# Patient Record
Sex: FEMALE | Race: WHITE | NOT HISPANIC OR LATINO | Employment: FULL TIME | URBAN - METROPOLITAN AREA
[De-identification: names, ages, dates, MRNs, and addresses within clinical notes are randomized per-mention and may not be internally consistent; named-entity substitution may affect disease eponyms.]

---
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
## 2021-01-01 ENCOUNTER — TELEPHONE (OUTPATIENT)
Dept: URGENT CARE | Facility: CLINIC | Age: 21
End: 2021-01-01

## 2022-02-09 ENCOUNTER — LAB (OUTPATIENT)
Dept: LAB | Facility: HOSPITAL | Age: 22
End: 2022-02-09

## 2022-02-09 ENCOUNTER — OFFICE VISIT (OUTPATIENT)
Dept: INTERNAL MEDICINE | Facility: CLINIC | Age: 22
End: 2022-02-09

## 2022-02-09 VITALS
TEMPERATURE: 98.3 F | BODY MASS INDEX: 21.24 KG/M2 | HEIGHT: 64 IN | SYSTOLIC BLOOD PRESSURE: 100 MMHG | DIASTOLIC BLOOD PRESSURE: 58 MMHG | WEIGHT: 124.4 LBS | OXYGEN SATURATION: 98 % | HEART RATE: 54 BPM | RESPIRATION RATE: 20 BRPM

## 2022-02-09 DIAGNOSIS — Z32.01 POSITIVE PREGNANCY TEST: Primary | ICD-10-CM

## 2022-02-09 DIAGNOSIS — Z32.01 POSITIVE PREGNANCY TEST: ICD-10-CM

## 2022-02-09 DIAGNOSIS — N92.6 MISSED PERIOD: ICD-10-CM

## 2022-02-09 LAB
B-HCG UR QL: POSITIVE
EXPIRATION DATE: ABNORMAL
INTERNAL NEGATIVE CONTROL: NEGATIVE
INTERNAL POSITIVE CONTROL: POSITIVE
Lab: ABNORMAL

## 2022-02-09 PROCEDURE — 84702 CHORIONIC GONADOTROPIN TEST: CPT | Performed by: PHYSICIAN ASSISTANT

## 2022-02-09 PROCEDURE — 99213 OFFICE O/P EST LOW 20 MIN: CPT | Performed by: PHYSICIAN ASSISTANT

## 2022-02-09 PROCEDURE — 81025 URINE PREGNANCY TEST: CPT | Performed by: PHYSICIAN ASSISTANT

## 2022-02-09 RX ORDER — PRENATAL VIT/IRON FUM/FOLIC AC 27MG-0.8MG
1 TABLET ORAL DAILY
Qty: 30 TABLET | Refills: 11 | OUTPATIENT
Start: 2022-02-09 | End: 2023-01-18

## 2022-02-09 NOTE — PROGRESS NOTES
"Chief Complaint  Pregnancy Questions (january 30th pos pregnancy test )    Subjective          History of Present Illness  Gricel Rahman presents to [facility] for   Missed period:  Had a positive home pregnancy test 01/30/22, took test d/t missed period. She had another positive the next morning. Has been feeling some morning sickness/nausea. No stomach pains or bleeding. Does not take meds on a regular basis.       Review of Systems   Constitutional: Negative for fever and unexpected weight loss.   Respiratory: Negative for cough, shortness of breath and wheezing.    Cardiovascular: Negative for chest pain and palpitations.       The following portions of the patient's history were reviewed and updated as appropriate: allergies, current medications, past family history, past medical history, past social history, past surgical history and problem list.  No Known Allergies  Current Outpatient Medications on File Prior to Visit   Medication Sig Dispense Refill   • [DISCONTINUED] metroNIDAZOLE (FLAGYL) 500 MG tablet Take 1 tablet by mouth 2 (Two) Times a Day. 14 tablet 0     No current facility-administered medications on file prior to visit.     New Medications Ordered This Visit   Medications   • Prenatal Vit-Fe Fumarate-FA (prenatal vitamin 27-0.8) 27-0.8 MG tablet tablet     Sig: Take 1 tablet by mouth Daily.     Dispense:  30 tablet     Refill:  11     Social History     Tobacco Use   Smoking Status Passive Smoke Exposure - Never Smoker   Smokeless Tobacco Never Used        Objective   Vital Signs:   Vitals:    02/09/22 1129   BP: 100/58   Pulse: 54   Resp: 20   Temp: 98.3 °F (36.8 °C)   TempSrc: Temporal   SpO2: 98%   Weight: 56.4 kg (124 lb 6.4 oz)   Height: 162.6 cm (64\")   PainSc: 0-No pain      Physical Exam  Vitals reviewed.   Constitutional:       General: She is not in acute distress.     Appearance: Normal appearance.   HENT:      Head: Normocephalic and atraumatic. "   Eyes:      General: No scleral icterus.     Extraocular Movements: Extraocular movements intact.      Conjunctiva/sclera: Conjunctivae normal.   Cardiovascular:      Rate and Rhythm: Normal rate and regular rhythm.      Heart sounds: Normal heart sounds. No murmur heard.      Pulmonary:      Effort: Pulmonary effort is normal. No respiratory distress.      Breath sounds: Normal breath sounds. No stridor. No wheezing or rhonchi.   Musculoskeletal:      Cervical back: Normal range of motion and neck supple.   Skin:     General: Skin is warm and dry.      Coloration: Skin is not jaundiced.   Neurological:      General: No focal deficit present.      Mental Status: She is alert and oriented to person, place, and time.      Gait: Gait normal.   Psychiatric:         Mood and Affect: Mood normal.         Behavior: Behavior normal.        Patient's last menstrual period was 12/26/2021 (approximate).    Result Review :              Results for orders placed or performed in visit on 02/09/22   POC Pregnancy, Urine    Specimen: Urine   Result Value Ref Range    HCG, Urine, QL Positive (A) Negative    Lot Number [id number]     Internal Positive Control Positive Positive, Passed    Internal Negative Control Negative Negative, Passed    Expiration Date 04/30/2023           Assessment and Plan    Diagnoses and all orders for this visit:    1. Positive pregnancy test (Primary)  Assessment & Plan:  Refer to GYN, start prenatal, check serum HCG    Orders:  -     POC Pregnancy, Urine  -     Prenatal Vit-Fe Fumarate-FA (prenatal vitamin 27-0.8) 27-0.8 MG tablet tablet; Take 1 tablet by mouth Daily.  Dispense: 30 tablet; Refill: 11  -     Ambulatory Referral to Obstetrics / Gynecology  -     hCG, Quantitative, Pregnancy; Future    2. Missed period  -     POC Pregnancy, Urine  -     Prenatal Vit-Fe Fumarate-FA (prenatal vitamin 27-0.8) 27-0.8 MG tablet tablet; Take 1 tablet by mouth Daily.  Dispense: 30 tablet; Refill: 11  -      Ambulatory Referral to Obstetrics / Gynecology  -     hCG, Quantitative, Pregnancy; Future      Follow Up   Return if symptoms worsen or fail to improve.    Follow up if symptoms worsen or persist or has new or concerning symptoms, go to ER for severe symptoms.   Reviewed common medication effects and side effects and advised to report side effects immediately.  Encouraged medication compliance and the importance of keeping scheduled follow up appointments with me and any other providers.  If a referral was made please contact our office if you have not heard about an appointment in the next 2 weeks.   If labs or images are ordered we will contact you with the results within the next week.  If you have not heard from us after a week please call our office to inquire about the results.   Patient was given instructions and counseling regarding her condition or for health maintenance advice. Please see specific information pulled into the AVS if appropriate.     Clarissa Matute PA-C    * Please note that portions of this note were completed with a voice recognition program.

## 2022-02-10 ENCOUNTER — TELEPHONE (OUTPATIENT)
Dept: INTERNAL MEDICINE | Facility: CLINIC | Age: 22
End: 2022-02-10

## 2022-02-10 LAB — HCG INTACT+B SERPL-ACNC: NORMAL MIU/ML

## 2022-02-10 NOTE — TELEPHONE ENCOUNTER
----- Message from Clarissa Matute PA-C sent at 2/10/2022  9:38 AM EST -----  Blood test shows that you are about 6-7 weeks pregnant and your estimated due date is 10.2.22 based on your last period of 12.26.21. Please let me know if you do not hear about an appointment for OB in the next 1-2 weeks.

## 2022-03-02 ENCOUNTER — OFFICE VISIT (OUTPATIENT)
Dept: OBSTETRICS AND GYNECOLOGY | Facility: CLINIC | Age: 22
End: 2022-03-02

## 2022-03-02 ENCOUNTER — LAB (OUTPATIENT)
Dept: LAB | Facility: HOSPITAL | Age: 22
End: 2022-03-02

## 2022-03-02 DIAGNOSIS — O02.1 MISSED ABORTION: ICD-10-CM

## 2022-03-02 DIAGNOSIS — O02.1 MISSED ABORTION: Primary | ICD-10-CM

## 2022-03-02 LAB
ABO GROUP BLD: NORMAL
RH BLD: POSITIVE

## 2022-03-02 PROCEDURE — 99202 OFFICE O/P NEW SF 15 MIN: CPT | Performed by: STUDENT IN AN ORGANIZED HEALTH CARE EDUCATION/TRAINING PROGRAM

## 2022-03-02 PROCEDURE — 86900 BLOOD TYPING SEROLOGIC ABO: CPT

## 2022-03-02 PROCEDURE — 86901 BLOOD TYPING SEROLOGIC RH(D): CPT

## 2022-03-02 NOTE — PROGRESS NOTES
Subjective   No chief complaint on file.    Gricel Rahman is a 21 y.o. year old No obstetric history on file..  No LMP recorded.  She presented today initially for a new OB, but was found to have a missed AB on ultrasound.  She does not have any vaginal bleeding, but has started to have intermittent low abdominal pain.    OTHER THINGS SHE WANTS TO DISCUSS TODAY:  Nothing else    The following portions of the patient's history were reviewed and updated as appropriate:current medications, allergies and past medical history    Social History    Tobacco Use      Smoking status: Passive Smoke Exposure - Never Smoker      Smokeless tobacco: Never Used         Objective   There were no vitals taken for this visit.    General:  well developed; well nourished  no acute distress   Lungs:  breathing is unlabored   Heart:  Not performed.     Lab Review   No data reviewed    Imaging   Pelvic ultrasound report        Assessment   1. Missed AB     Plan   1. Discussed findings on ultrasound today consistent with missed AB.  Discussed all options including expectant versus medical versus surgical management.  At this time patient elects for expectant management.  We will follow-up in 1 week for repeat ultrasound.  Patient advised if she develops heavy vaginal bleeding to the point where she is experiencing dizziness, or feeling more than 1 pad per hour, patient advised to be seen in the ED for evaluation.   2. ABO Rh ordered for blood type   3. The importance of keeping all planned follow-up and taking all medications as prescribed was emphasized.  4. Follow up 1 week     No orders of the defined types were placed in this encounter.         This note was electronically signed.    Darshana Gibbs MD   March 2, 2022

## 2022-03-09 ENCOUNTER — OFFICE VISIT (OUTPATIENT)
Dept: OBSTETRICS AND GYNECOLOGY | Facility: CLINIC | Age: 22
End: 2022-03-09

## 2022-03-09 VITALS
BODY MASS INDEX: 22.53 KG/M2 | RESPIRATION RATE: 14 BRPM | DIASTOLIC BLOOD PRESSURE: 62 MMHG | HEIGHT: 64 IN | WEIGHT: 132 LBS | SYSTOLIC BLOOD PRESSURE: 106 MMHG

## 2022-03-09 DIAGNOSIS — O02.1 MISSED ABORTION: Primary | ICD-10-CM

## 2022-03-09 PROCEDURE — 99214 OFFICE O/P EST MOD 30 MIN: CPT | Performed by: STUDENT IN AN ORGANIZED HEALTH CARE EDUCATION/TRAINING PROGRAM

## 2022-03-09 NOTE — PROGRESS NOTES
"Subjective   Chief Complaint   Patient presents with   • Pregnancy Ultrasound     Missed AB     Gricel Rahman is a 21 y.o. year old .  No LMP recorded (lmp unknown). Patient is pregnant.  She presents for follow up of missed AB diagnosed on US 3/2/22. She reports she has had no bleeding at all since the last visit.     OTHER THINGS SHE WANTS TO DISCUSS TODAY:  Nothing else    The following portions of the patient's history were reviewed and updated as appropriate:current medications, allergies and past medical history    Social History    Tobacco Use      Smoking status: Passive Smoke Exposure - Never Smoker      Smokeless tobacco: Never Used         Objective   /62 (BP Location: Left arm, Patient Position: Sitting, Cuff Size: Adult)   Resp 14   Ht 162.6 cm (64\")   Wt 59.9 kg (132 lb)   LMP  (LMP Unknown)   Breastfeeding No   BMI 22.66 kg/m²     General:  well developed; well nourished  no acute distress   Lungs:  breathing is unlabored   Heart:  Not performed.     Lab Review   No data reviewed    Imaging   Pelvic ultrasound report        Assessment   1. Missed AB  2. Rh +     Plan   1. Discussed medical versus surgical management at this time.  Patient elects for surgical management. Today I discussed with Gricel the risks of her upcoming suction D & C. Risks including intraoperative bleeding, uterine infection, perforation of the uterine cavity, failure to fully remove all the products of conception, laceration of the cervix, catheter induced urinary tract infections and the small risk for deep vein thrombosis were all explained. Additionally, the small risk for reoperation in the event of unanticipated bleeding or surgical injury was discussed.  All of her questions were answered fully.  She left with a very clear understanding of the preoperative surgical indications and the nature of a suction D & C.  She understands to be NPO after midnight and to be at the preoperative area ~ 1 hour prior to " the scheduled surgical start time.  2. The importance of keeping all planned follow-up and taking all medications as prescribed was emphasized.  3. Follow up for procedure.     No orders of the defined types were placed in this encounter.         This note was electronically signed.    Darshana Gibbs MD   March 9, 2022

## 2022-03-10 DIAGNOSIS — Z01.818 PRE-OP TESTING: Primary | ICD-10-CM

## 2022-03-13 ENCOUNTER — LAB (OUTPATIENT)
Dept: PREADMISSION TESTING | Facility: HOSPITAL | Age: 22
End: 2022-03-13

## 2022-03-13 DIAGNOSIS — Z01.818 PRE-OP TESTING: ICD-10-CM

## 2022-03-13 LAB — SARS-COV-2 RNA PNL SPEC NAA+PROBE: NOT DETECTED

## 2022-03-13 PROCEDURE — U0004 COV-19 TEST NON-CDC HGH THRU: HCPCS

## 2022-03-13 PROCEDURE — C9803 HOPD COVID-19 SPEC COLLECT: HCPCS

## 2022-03-14 ENCOUNTER — PREP FOR SURGERY (OUTPATIENT)
Dept: OTHER | Facility: HOSPITAL | Age: 22
End: 2022-03-14

## 2022-03-14 NOTE — H&P
Gricel Rahman  : 2000  MRN: [id number]  CSN: [id number]    History and Physical    Subjective   Gricel Rahman is a 21 y.o. year old  who present for suction D & C due to incomplete miscarriage.  Her blood type is O+.    Past Medical History:   Diagnosis Date   • MRSA (methicillin resistant staph aureus) culture positive 2017   • Pneumonia     as inflant, multiple episodes     No past surgical history on file.  OB History    Para Term  AB Living   1 0 0 0 0 0   SAB IAB Ectopic Molar Multiple Live Births   0 0 0 0 0 0      # Outcome Date GA Lbr Esdras/2nd Weight Sex Delivery Anes PTL Lv   1 Current              Social History    Tobacco Use      Smoking status: Passive Smoke Exposure - Never Smoker      Smokeless tobacco: Never Used      Current Outpatient Medications:   •  Prenatal Vit-Fe Fumarate-FA (prenatal vitamin 27-0.8) 27-0.8 MG tablet tablet, Take 1 tablet by mouth Daily., Disp: 30 tablet, Rfl: 11    No Known Allergies    Review of Systems   All other systems reviewed and are negative.        Objective     Vital Signs: See nursing documentation   General: well developed; well nourished  no acute distress   Mental status: Alert and oriented   Heart: Not performed.   Lungs: breathing is unlabored   Abdomen: soft, non-tender; no masses   Pelvis: Not performed.        Assessment   1. Missed AB  2. Rh+     Plan   1. Suction D & C  2. I have previously discussed with Gricel the risks of her suction D & C. Risks including intraoperative bleeding, uterine infection, perforation of the uterine cavity, failure to fully  move all the products of conception, laceration of the cervix, catheter induced urinary tract infections and the small risk for deep vein thrombosis were all explained. Additionally, the small risk for reoperation in the event of unanticipated bleeding or surgical injury was discussed.        Darshana Gibbs MD       (Pt's PCP is Denisse Hermosillo APRN)

## 2022-03-15 ENCOUNTER — OUTSIDE FACILITY SERVICE (OUTPATIENT)
Dept: OBSTETRICS AND GYNECOLOGY | Facility: CLINIC | Age: 22
End: 2022-03-15

## 2022-03-15 ENCOUNTER — LAB REQUISITION (OUTPATIENT)
Dept: LAB | Facility: HOSPITAL | Age: 22
End: 2022-03-15

## 2022-03-15 DIAGNOSIS — O02.1 MISSED ABORTION: ICD-10-CM

## 2022-03-15 PROCEDURE — 88305 TISSUE EXAM BY PATHOLOGIST: CPT | Performed by: STUDENT IN AN ORGANIZED HEALTH CARE EDUCATION/TRAINING PROGRAM

## 2022-03-15 PROCEDURE — 59820 CARE OF MISCARRIAGE: CPT | Performed by: STUDENT IN AN ORGANIZED HEALTH CARE EDUCATION/TRAINING PROGRAM

## 2022-03-15 RX ORDER — IBUPROFEN 600 MG/1
600 TABLET ORAL EVERY 6 HOURS PRN
Qty: 60 TABLET | Refills: 1 | OUTPATIENT
Start: 2022-03-15 | End: 2023-01-18

## 2022-03-15 RX ORDER — DOCUSATE SODIUM 100 MG/1
100 CAPSULE, LIQUID FILLED ORAL 2 TIMES DAILY
Qty: 60 CAPSULE | Refills: 1 | OUTPATIENT
Start: 2022-03-15 | End: 2023-01-18

## 2022-03-16 LAB
CYTO UR: NORMAL
LAB AP CASE REPORT: NORMAL
LAB AP CLINICAL INFORMATION: NORMAL
PATH REPORT.FINAL DX SPEC: NORMAL
PATH REPORT.GROSS SPEC: NORMAL

## 2022-03-30 ENCOUNTER — OFFICE VISIT (OUTPATIENT)
Dept: OBSTETRICS AND GYNECOLOGY | Facility: CLINIC | Age: 22
End: 2022-03-30

## 2022-03-30 VITALS
DIASTOLIC BLOOD PRESSURE: 66 MMHG | WEIGHT: 135 LBS | SYSTOLIC BLOOD PRESSURE: 112 MMHG | RESPIRATION RATE: 14 BRPM | BODY MASS INDEX: 23.17 KG/M2

## 2022-03-30 DIAGNOSIS — Z98.890 S/P D&C (STATUS POST DILATION AND CURETTAGE): Primary | ICD-10-CM

## 2022-03-30 PROCEDURE — 99024 POSTOP FOLLOW-UP VISIT: CPT | Performed by: STUDENT IN AN ORGANIZED HEALTH CARE EDUCATION/TRAINING PROGRAM

## 2022-03-30 NOTE — PROGRESS NOTES
Subjective   Chief Complaint   Patient presents with   • Post-op     Gricel Rahman is a 21 y.o. year old  presenting to be seen for her post-operative visit.  Currently she reports no problems with eating, bowel movements, voiding. She is still having intermittent bleeding.     The results were discussed with Gricel.    OTHER THINGS SHE WANTS TO DISCUSS TODAY:  Nothing else    The following portions of the patient's history were reviewed and updated as appropriate:current medications and allergies       Objective   /66   Resp 14   Wt 61.2 kg (135 lb)   LMP 2021   Breastfeeding No   BMI 23.17 kg/m²     General:  well developed; well nourished  no acute distress          Assessment   1. S/P suction D & C on 3/15/22      Plan   1. Nothing per vagina still until 4-6 weeks after her procedure.  Discussed vaginal spotting and intermittent bleeding can be normal up to 6 to 12 weeks after miscarriages.  Reassurance provided.  2. Declines contraception, recommend condoms at this time.   3. The importance of keeping all planned follow-up and taking all medications as prescribed was emphasized.  4. Follow up for annual exam in 1 year. Or sooner PRN     No orders of the defined types were placed in this encounter.         This note was electronically signed.    Darshana Gibbs MD .  2022

## 2022-04-21 ENCOUNTER — PATIENT MESSAGE (OUTPATIENT)
Dept: OBSTETRICS AND GYNECOLOGY | Facility: CLINIC | Age: 22
End: 2022-04-21

## 2022-04-22 NOTE — TELEPHONE ENCOUNTER
From: Gricel Rahman  To: Darshana Gibbs MD  Sent: 4/21/2022 6:36 PM EDT  Subject: D&C    Hello, I had a D&C March 15th and today I’ve been having heavy bleeding and passing clots of what looks like tissue. It’s been going on since about 4am and it’s been so consistent I’ve had to change multiple times. I felt silver foggy this morning but I feel fine now. I assumed it was my first period but I wanted to message just to make sure there was nothing to worry about.   Thank you!

## 2023-03-05 PROCEDURE — U0004 COV-19 TEST NON-CDC HGH THRU: HCPCS | Performed by: NURSE PRACTITIONER

## 2023-05-12 ENCOUNTER — OFFICE VISIT (OUTPATIENT)
Dept: INTERNAL MEDICINE | Facility: CLINIC | Age: 23
End: 2023-05-12

## 2023-05-12 ENCOUNTER — LAB (OUTPATIENT)
Dept: LAB | Facility: HOSPITAL | Age: 23
End: 2023-05-12
Payer: COMMERCIAL

## 2023-05-12 VITALS
SYSTOLIC BLOOD PRESSURE: 110 MMHG | RESPIRATION RATE: 16 BRPM | TEMPERATURE: 98.1 F | DIASTOLIC BLOOD PRESSURE: 64 MMHG | WEIGHT: 126 LBS | HEIGHT: 64 IN | BODY MASS INDEX: 21.51 KG/M2 | OXYGEN SATURATION: 98 % | HEART RATE: 73 BPM

## 2023-05-12 DIAGNOSIS — N89.8 VAGINAL LESION: ICD-10-CM

## 2023-05-12 DIAGNOSIS — Z13.0 SCREENING FOR BLOOD DISEASE: ICD-10-CM

## 2023-05-12 DIAGNOSIS — Z13.21 ENCOUNTER FOR VITAMIN DEFICIENCY SCREENING: ICD-10-CM

## 2023-05-12 DIAGNOSIS — N30.01 ACUTE CYSTITIS WITH HEMATURIA: ICD-10-CM

## 2023-05-12 DIAGNOSIS — R10.2 PELVIC PAIN: Primary | ICD-10-CM

## 2023-05-12 DIAGNOSIS — Z87.59 HISTORY OF MISCARRIAGE: ICD-10-CM

## 2023-05-12 DIAGNOSIS — Z11.59 ENCOUNTER FOR HEPATITIS C SCREENING TEST FOR LOW RISK PATIENT: ICD-10-CM

## 2023-05-12 DIAGNOSIS — R10.2 PELVIC PAIN: ICD-10-CM

## 2023-05-12 DIAGNOSIS — Z13.29 THYROID DISORDER SCREENING: ICD-10-CM

## 2023-05-12 DIAGNOSIS — Z13.220 LIPID SCREENING: ICD-10-CM

## 2023-05-12 DIAGNOSIS — Z13.1 SCREENING FOR DIABETES MELLITUS: ICD-10-CM

## 2023-05-12 LAB
BASOPHILS # BLD AUTO: 0.09 10*3/MM3 (ref 0–0.2)
BASOPHILS NFR BLD AUTO: 1.2 % (ref 0–1.5)
DEPRECATED RDW RBC AUTO: 37 FL (ref 37–54)
EOSINOPHIL # BLD AUTO: 0.55 10*3/MM3 (ref 0–0.4)
EOSINOPHIL NFR BLD AUTO: 7.1 % (ref 0.3–6.2)
ERYTHROCYTE [DISTWIDTH] IN BLOOD BY AUTOMATED COUNT: 11.6 % (ref 12.3–15.4)
HBA1C MFR BLD: 5.2 % (ref 4.8–5.6)
HCT VFR BLD AUTO: 37.4 % (ref 34–46.6)
HGB BLD-MCNC: 12.8 G/DL (ref 12–15.9)
IMM GRANULOCYTES # BLD AUTO: 0.02 10*3/MM3 (ref 0–0.05)
IMM GRANULOCYTES NFR BLD AUTO: 0.3 % (ref 0–0.5)
LYMPHOCYTES # BLD AUTO: 2.04 10*3/MM3 (ref 0.7–3.1)
LYMPHOCYTES NFR BLD AUTO: 26.3 % (ref 19.6–45.3)
MCH RBC QN AUTO: 29.8 PG (ref 26.6–33)
MCHC RBC AUTO-ENTMCNC: 34.2 G/DL (ref 31.5–35.7)
MCV RBC AUTO: 87 FL (ref 79–97)
MONOCYTES # BLD AUTO: 0.78 10*3/MM3 (ref 0.1–0.9)
MONOCYTES NFR BLD AUTO: 10.1 % (ref 5–12)
NEUTROPHILS NFR BLD AUTO: 4.27 10*3/MM3 (ref 1.7–7)
NEUTROPHILS NFR BLD AUTO: 55 % (ref 42.7–76)
NRBC BLD AUTO-RTO: 0 /100 WBC (ref 0–0.2)
PLATELET # BLD AUTO: 330 10*3/MM3 (ref 140–450)
PMV BLD AUTO: 10.6 FL (ref 6–12)
RBC # BLD AUTO: 4.3 10*6/MM3 (ref 3.77–5.28)
WBC NRBC COR # BLD: 7.75 10*3/MM3 (ref 3.4–10.8)

## 2023-05-12 PROCEDURE — 87591 N.GONORRHOEAE DNA AMP PROB: CPT

## 2023-05-12 PROCEDURE — 87077 CULTURE AEROBIC IDENTIFY: CPT

## 2023-05-12 PROCEDURE — 87798 DETECT AGENT NOS DNA AMP: CPT

## 2023-05-12 PROCEDURE — 86696 HERPES SIMPLEX TYPE 2 TEST: CPT | Performed by: NURSE PRACTITIONER

## 2023-05-12 PROCEDURE — 87801 DETECT AGNT MULT DNA AMPLI: CPT

## 2023-05-12 PROCEDURE — 87491 CHLMYD TRACH DNA AMP PROBE: CPT

## 2023-05-12 PROCEDURE — 83036 HEMOGLOBIN GLYCOSYLATED A1C: CPT | Performed by: NURSE PRACTITIONER

## 2023-05-12 PROCEDURE — 87529 HSV DNA AMP PROBE: CPT

## 2023-05-12 PROCEDURE — 82306 VITAMIN D 25 HYDROXY: CPT | Performed by: NURSE PRACTITIONER

## 2023-05-12 PROCEDURE — 80061 LIPID PANEL: CPT | Performed by: NURSE PRACTITIONER

## 2023-05-12 PROCEDURE — 86803 HEPATITIS C AB TEST: CPT | Performed by: NURSE PRACTITIONER

## 2023-05-12 PROCEDURE — 80050 GENERAL HEALTH PANEL: CPT | Performed by: NURSE PRACTITIONER

## 2023-05-12 PROCEDURE — 82607 VITAMIN B-12: CPT | Performed by: NURSE PRACTITIONER

## 2023-05-12 PROCEDURE — 87661 TRICHOMONAS VAGINALIS AMPLIF: CPT

## 2023-05-12 PROCEDURE — 86695 HERPES SIMPLEX TYPE 1 TEST: CPT | Performed by: NURSE PRACTITIONER

## 2023-05-12 PROCEDURE — 82746 ASSAY OF FOLIC ACID SERUM: CPT | Performed by: NURSE PRACTITIONER

## 2023-05-12 PROCEDURE — 87186 SC STD MICRODIL/AGAR DIL: CPT

## 2023-05-12 PROCEDURE — 87086 URINE CULTURE/COLONY COUNT: CPT

## 2023-05-12 RX ORDER — NITROFURANTOIN 25; 75 MG/1; MG/1
100 CAPSULE ORAL 2 TIMES DAILY
Qty: 14 CAPSULE | Refills: 0 | Status: SHIPPED | OUTPATIENT
Start: 2023-05-12 | End: 2023-05-19

## 2023-05-12 NOTE — PROGRESS NOTES
Subjective   Chief Complaint   Patient presents with   • Establish Care   • Abdominal Pain     Pt reports abdominal pain and pelvic pain since have D&C in 3/2022      Gricel Rahman is a 22 y.o. female.     The patient is here today to establish care and for complaint of abdominal pain. She wants to follow up on a D&C from 03/2022.    Pelvic pain/Vaginal irritation  The patient had a D&C in 03/2022. She had a silent miscarriage with no discharge. She was aware that she was pregnant at approximately 4 weeks. On her first ultrasound, no heartbeat was found. However, she never had any symptoms afterwards. At what would have been 10 weeks' gestation, she had a D&C after which her abdominal pain never resolved. She notes random stabbing pain and irritation that she associates with either a yeast infection or urinary tract infection. She did notice a mild foul odor over the last 2 weeks, for which she took some AZO pills. Following this, her itching and urinary symptoms resolved that same evening. Overall, she reports the symptoms resemble the presentation of a UTI but are intermittent. Other than the D&C, the patient has never had any gynecologic evaluation, Pap smear, etc.     Of note, the patient reports an unsatisfactory experience with her miscarriage and subsequent D&C at [facility] in that she was treated in a callous and uncaring manner.      UTI  The patient notes foul smelling urine. She presented to urgent care a few weeks ago and was told that the malodor was due to eating broccoli and asparagus.     Vaginal skin lesion  The patient reports a skin lesion adjacent to her clitoris that becomes intermittently irritated and painful recently, oftentimes associated with urinary symptoms. When she first noticed the area, she states it felt like a cut or a split in the skin with associated burning.     Menstrual cycle  The patient uses the Flow application to track her menstrual cycles. Over the past 2 to 3 months, her  periods have been a few days early; however, the duration is normal at 5 days.     Health maintenance  The patient has never had annual labs performed. Her only blood work was performed at urgent care when she presented for pregnancy.     I have reviewed the following portions of the patient's history and confirmed they are accurate: allergies, current medications, past family history, past medical history, past social history, past surgical history, and problem list    I have personally completed the patient's review of systems.    Review of Systems   Constitutional: Negative for activity change, appetite change, chills, diaphoresis, fatigue, fever, unexpected weight gain and unexpected weight loss.   HENT: Negative for ear discharge, ear pain, mouth sores, nosebleeds, sinus pressure, sneezing and sore throat.    Eyes: Negative for pain, discharge and itching.   Respiratory: Negative for cough, chest tightness, shortness of breath and wheezing.    Cardiovascular: Negative for chest pain, palpitations and leg swelling.   Gastrointestinal: Negative for abdominal pain, constipation, diarrhea, nausea and vomiting.   Endocrine: Negative for heat intolerance, polydipsia and polyphagia.   Genitourinary: Positive for dysuria, genital sores, pelvic pain and urgency. Negative for flank pain, frequency and hematuria.   Musculoskeletal: Negative for arthralgias, back pain, gait problem, joint swelling, myalgias, neck pain and neck stiffness.   Skin: Negative for color change, pallor and rash.   Allergic/Immunologic: Negative for immunocompromised state.   Neurological: Negative for seizures, speech difficulty, weakness and numbness.   Hematological: Negative for adenopathy.   Psychiatric/Behavioral: Negative for agitation, decreased concentration, sleep disturbance, suicidal ideas and depressed mood. The patient is not nervous/anxious.        Current Outpatient Medications on File Prior to Visit   Medication Sig   •  "[DISCONTINUED] multivitamin with minerals tablet tablet Take 1 tablet by mouth Daily. otc   • [DISCONTINUED] ondansetron ODT (ZOFRAN-ODT) 4 MG disintegrating tablet Place 1 tablet on the tongue Every 8 (Eight) Hours As Needed for Nausea or Vomiting.     No current facility-administered medications on file prior to visit.       Objective   Vitals:    05/12/23 1400   BP: 110/64   Pulse: 73   Resp: 16   Temp: 98.1 °F (36.7 °C)   TempSrc: Tympanic   SpO2: 98%   Weight: 57.2 kg (126 lb)   Height: 162.6 cm (64\")     Body mass index is 21.63 kg/m².    Physical Exam  Vitals reviewed.   Constitutional:       Appearance: Normal appearance. She is well-developed.   HENT:      Head: Normocephalic and atraumatic.      Nose: Nose normal.   Eyes:      General: Lids are normal.      Conjunctiva/sclera: Conjunctivae normal.      Pupils: Pupils are equal, round, and reactive to light.   Neck:      Thyroid: No thyromegaly.      Trachea: Trachea normal.   Pulmonary:      Effort: Pulmonary effort is normal. No respiratory distress.   Skin:     General: Skin is warm and dry.   Neurological:      Mental Status: She is alert and oriented to person, place, and time.      GCS: GCS eye subscore is 4. GCS verbal subscore is 5. GCS motor subscore is 6.   Psychiatric:         Attention and Perception: Attention normal.         Mood and Affect: Mood normal.         Speech: Speech normal.         Behavior: Behavior normal. Behavior is cooperative.         Assessment & Plan   Problem List Items Addressed This Visit    None  Visit Diagnoses     Pelvic pain    -  Primary    Relevant Orders    Nuab VG+ & HSV    Ambulatory Referral to Obstetrics / Gynecology (Completed)    Comprehensive Metabolic Panel    CBC Auto Differential    HSV 1 & 2 - Specific Antibody, IgG    Acute cystitis with hematuria        Relevant Medications    nitrofurantoin, macrocrystal-monohydrate, (Macrobid) 100 MG capsule    Other Relevant Orders    POCT urinalysis dipstick, " manual    NuSwab VG+ & HSV    Urine Culture - Urine, Urine, Clean Catch    Vaginal lesion        Relevant Orders    Ambulatory Referral to Obstetrics / Gynecology (Completed)    Comprehensive Metabolic Panel    CBC Auto Differential    HSV 1 & 2 - Specific Antibody, IgG    History of miscarriage        Relevant Orders    Ambulatory Referral to Obstetrics / Gynecology (Completed)    Screening for blood disease        Relevant Orders    Comprehensive Metabolic Panel    Lipid Panel    Hemoglobin A1c    TSH Rfx On Abnormal To Free T4    Vitamin B12 & Folate    Vitamin D,25-Hydroxy    Hepatitis C Antibody    CBC Auto Differential    HSV 1 & 2 - Specific Antibody, IgG    Thyroid disorder screening        Relevant Orders    TSH Rfx On Abnormal To Free T4    Lipid screening        Relevant Orders    Lipid Panel    Encounter for vitamin deficiency screening        Relevant Orders    Vitamin B12 & Folate    Vitamin D,25-Hydroxy    Screening for diabetes mellitus        Relevant Orders    Hemoglobin A1c    Encounter for hepatitis C screening test for low risk patient             1. Pelvic pain       - New, unstable.       - Referring to gynecology for consult.       - Treating for urinary tract infection.        - Vaginal swab completed.        - Screening labs with STD panel being ordered.    2. Acute cystitis with hematuria       - New, unstable.       - Start Macrobid.        - Increase water intake.        - If patient's symptoms do not improve, consider rechecking urine or repeating UA.        - Sending urine for culture.    3. Vaginal lesion       - New, unstable.       - Referring the patient to gynecology for consult.        - Completed vaginal swab and completing blood STD panel today.      Current Outpatient Medications:   •  nitrofurantoin, macrocrystal-monohydrate, (Macrobid) 100 MG capsule, Take 1 capsule by mouth 2 (Two) Times a Day for 7 days., Disp: 14 capsule, Rfl: 0       Plan of care reviewed with the  patient at the conclusion of today's visit.  Education was provided regarding diagnosis, management, and any prescribed or recommended OTC medications.  Patient verbalized understanding of and agreement with management plan.     No follow-ups on file.      Transcribed from ambient dictation for STALIN Redd by STALIN Redd.  05/12/23   15:01 EDT    Patient or patient representative verbalized consent to the visit recording.  I have personally performed the services described in this document as transcribed by the above individual, and it is both accurate and complete.     Transcribed from ambient dictation for STALIN Redd by Shilpi Rai.  05/12/23   17:14 EDT    Patient or patient representative verbalized consent to the visit recording.  I have personally performed the services described in this document as transcribed by the above individual, and it is both accurate and complete.

## 2023-05-13 LAB
25(OH)D3 SERPL-MCNC: 18.1 NG/ML (ref 30–100)
ALBUMIN SERPL-MCNC: 4.9 G/DL (ref 3.5–5.2)
ALBUMIN/GLOB SERPL: 2 G/DL
ALP SERPL-CCNC: 69 U/L (ref 39–117)
ALT SERPL W P-5'-P-CCNC: 12 U/L (ref 1–33)
ANION GAP SERPL CALCULATED.3IONS-SCNC: 9 MMOL/L (ref 5–15)
AST SERPL-CCNC: 21 U/L (ref 1–32)
BILIRUB SERPL-MCNC: 0.3 MG/DL (ref 0–1.2)
BUN SERPL-MCNC: 13 MG/DL (ref 6–20)
BUN/CREAT SERPL: 16.3 (ref 7–25)
CALCIUM SPEC-SCNC: 9.7 MG/DL (ref 8.6–10.5)
CHLORIDE SERPL-SCNC: 105 MMOL/L (ref 98–107)
CHOLEST SERPL-MCNC: 144 MG/DL (ref 0–200)
CO2 SERPL-SCNC: 26 MMOL/L (ref 22–29)
CREAT SERPL-MCNC: 0.8 MG/DL (ref 0.57–1)
EGFRCR SERPLBLD CKD-EPI 2021: 107.7 ML/MIN/1.73
FOLATE SERPL-MCNC: 6.85 NG/ML (ref 4.78–24.2)
GLOBULIN UR ELPH-MCNC: 2.5 GM/DL
GLUCOSE SERPL-MCNC: 82 MG/DL (ref 65–99)
HCV AB SER DONR QL: NORMAL
HDLC SERPL-MCNC: 58 MG/DL (ref 40–60)
LDLC SERPL CALC-MCNC: 77 MG/DL (ref 0–100)
LDLC/HDLC SERPL: 1.36 {RATIO}
POTASSIUM SERPL-SCNC: 4.3 MMOL/L (ref 3.5–5.2)
PROT SERPL-MCNC: 7.4 G/DL (ref 6–8.5)
SODIUM SERPL-SCNC: 140 MMOL/L (ref 136–145)
TRIGL SERPL-MCNC: 35 MG/DL (ref 0–150)
TSH SERPL DL<=0.05 MIU/L-ACNC: 1.72 UIU/ML (ref 0.27–4.2)
VIT B12 BLD-MCNC: 521 PG/ML (ref 211–946)
VLDLC SERPL-MCNC: 9 MG/DL (ref 5–40)

## 2023-05-14 LAB
BACTERIA SPEC AEROBE CULT: ABNORMAL
HSV1 IGG SER IA-ACNC: 18.4 INDEX (ref 0–0.9)
HSV2 IGG SER IA-ACNC: <0.91 INDEX (ref 0–0.9)

## 2023-05-16 ENCOUNTER — PATIENT ROUNDING (BHMG ONLY) (OUTPATIENT)
Dept: INTERNAL MEDICINE | Facility: CLINIC | Age: 23
End: 2023-05-16
Payer: COMMERCIAL

## 2023-05-18 LAB
A VAGINAE DNA VAG QL NAA+PROBE: NORMAL SCORE
BVAB2 DNA VAG QL NAA+PROBE: NORMAL SCORE
C ALBICANS DNA VAG QL NAA+PROBE: NEGATIVE
C GLABRATA DNA VAG QL NAA+PROBE: NEGATIVE
C TRACH DNA VAG QL NAA+PROBE: NEGATIVE
HSV1 DNA SPEC QL NAA+PROBE: NEGATIVE
HSV2 DNA SPEC QL NAA+PROBE: NEGATIVE
MEGA1 DNA VAG QL NAA+PROBE: NORMAL SCORE
N GONORRHOEA DNA VAG QL NAA+PROBE: NEGATIVE
T VAGINALIS DNA VAG QL NAA+PROBE: NEGATIVE

## 2023-05-18 RX ORDER — ERGOCALCIFEROL 1.25 MG/1
50000 CAPSULE ORAL WEEKLY
Qty: 4 CAPSULE | Refills: 2 | Status: SHIPPED | OUTPATIENT
Start: 2023-05-18

## 2023-11-20 ENCOUNTER — HOSPITAL ENCOUNTER (OUTPATIENT)
Dept: GENERAL RADIOLOGY | Facility: HOSPITAL | Age: 23
Discharge: HOME | End: 2023-11-20
Admitting: INTERNAL MEDICINE
Payer: COMMERCIAL

## 2023-11-20 ENCOUNTER — OFFICE VISIT (OUTPATIENT)
Dept: INTERNAL MEDICINE | Facility: CLINIC | Age: 23
End: 2023-11-20
Payer: COMMERCIAL

## 2023-11-20 VITALS
SYSTOLIC BLOOD PRESSURE: 110 MMHG | DIASTOLIC BLOOD PRESSURE: 68 MMHG | BODY MASS INDEX: 21.17 KG/M2 | HEIGHT: 64 IN | RESPIRATION RATE: 16 BRPM | HEART RATE: 76 BPM | WEIGHT: 124 LBS | OXYGEN SATURATION: 99 % | TEMPERATURE: 96.8 F

## 2023-11-20 DIAGNOSIS — S89.92XA LEFT KNEE INJURY, INITIAL ENCOUNTER: Primary | ICD-10-CM

## 2023-11-20 PROCEDURE — 73562 X-RAY EXAM OF KNEE 3: CPT

## 2023-11-20 RX ORDER — NAPROXEN 250 MG/1
250 TABLET ORAL 2 TIMES DAILY PRN
Qty: 20 TABLET | Refills: 0 | Status: SHIPPED | OUTPATIENT
Start: 2023-11-20 | End: 2023-11-30

## 2023-11-20 NOTE — PROGRESS NOTES
"     Follow Up Office Visit      Date: 2023   Patient Name: Gricel Rahman  : 2000   MRN: [id number]     Chief Complaint:    Chief Complaint   Patient presents with    Knee Pain     L knee pain- hurt 1 wk ago, hurts to extend it       History of Present Illness: Gricel Rahman is a 23 y.o. female who is here today to follow up with left knee pain. 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  Had a \"popping\" sound when twisted. Hurt for 2 hours with deep throbbing bone pain. No swelling initially for first week. Over last week has gotten more swollen and can't fully extend. Progressively worsening with increasing pain.            Subjective      Past Medical History:   Diagnosis Date    MRSA (methicillin resistant staph aureus) culture positive 2017    Pneumonia     as inflant, multiple episodes       Past Surgical History:   Procedure Laterality Date    DILATATION AND CURETTAGE         No family history on file.     Social History     Socioeconomic History    Marital status: Single   Tobacco Use    Smoking status: Never    Smokeless tobacco: Never   Vaping Use    Vaping Use: Never used   Substance and Sexual Activity    Alcohol use: No     Comment: none since positive preg test    Drug use: No    Sexual activity: Yes         I have reviewed the patients family history, social history, past medical history, past surgical history and have updated it as appropriate.     Medications:     Current Outpatient Medications:     naproxen (NAPROSYN) 250 MG tablet, Take 1 tablet by mouth 2 (Two) Times a Day As Needed for Mild Pain for up to 10 days., Disp: 20 tablet, Rfl: 0    vitamin D (ERGOCALCIFEROL) 1.25 MG (41038 UT) capsule capsule, Take 1 capsule by mouth 1 (One) Time Per Week. (Patient not taking: Reported on 2023), Disp: 4 capsule, Rfl: 2    Allergies:   No Known Allergies    Objective       Vital Signs:   Vitals:    23 1500   BP: 110/68   BP " "Location: Left arm   Patient Position: Sitting   Cuff Size: Adult   Pulse: 76   Resp: 16   Temp: 96.8 °F (36 °C)   TempSrc: Tympanic   SpO2: 99%   Weight: 56.2 kg (124 lb)   Height: 162.6 cm (64\")     Body mass index is 21.28 kg/m².    Physical Exam:  Physical Exam  Musculoskeletal:         General: Swelling and tenderness present.      Comments: Left knee swelling.  Slight elasticity with anterior drawer testing. Pain with extension.          Procedures    Results:   Xray ordered    Assessment / Plan      Assessment/Plan:   Diagnoses and all orders for this visit:    1. Left knee injury, initial encounter (Primary)  -     naproxen (NAPROSYN) 250 MG tablet; Take 1 tablet by mouth 2 (Two) Times a Day As Needed for Mild Pain for up to 10 days.  Dispense: 20 tablet; Refill: 0  -     Ambulatory Referral to Physical Therapy Evaluate and treat  -     XR Knee 3 View Left       If no improvement in 1-2 weeks and with PT and RICE with naproxen then will need  MRI.   BMI is within normal parameters. No other follow-up for BMI required.       Follow Up:   Return in about 4 weeks (around 12/18/2023).    Lester Allan DO    [facility]  "

## 2023-12-18 ENCOUNTER — OFFICE VISIT (OUTPATIENT)
Dept: INTERNAL MEDICINE | Facility: CLINIC | Age: 23
End: 2023-12-18
Payer: COMMERCIAL

## 2023-12-18 VITALS
DIASTOLIC BLOOD PRESSURE: 78 MMHG | SYSTOLIC BLOOD PRESSURE: 116 MMHG | RESPIRATION RATE: 18 BRPM | HEIGHT: 64 IN | HEART RATE: 80 BPM | OXYGEN SATURATION: 100 % | BODY MASS INDEX: 21.61 KG/M2 | TEMPERATURE: 98.4 F | WEIGHT: 126.6 LBS

## 2023-12-18 DIAGNOSIS — J03.90 TONSILLITIS: ICD-10-CM

## 2023-12-18 DIAGNOSIS — M25.562 ACUTE PAIN OF LEFT KNEE: Primary | ICD-10-CM

## 2023-12-18 DIAGNOSIS — R05.1 ACUTE COUGH: ICD-10-CM

## 2023-12-18 LAB
EXPIRATION DATE: NORMAL
EXPIRATION DATE: NORMAL
FLUAV AG UPPER RESP QL IA.RAPID: NOT DETECTED
FLUBV AG UPPER RESP QL IA.RAPID: NOT DETECTED
INTERNAL CONTROL: NORMAL
INTERNAL CONTROL: NORMAL
Lab: NORMAL
Lab: NORMAL
S PYO AG THROAT QL: NEGATIVE
SARS-COV-2 AG UPPER RESP QL IA.RAPID: NOT DETECTED

## 2023-12-18 PROCEDURE — 1159F MED LIST DOCD IN RCRD: CPT | Performed by: NURSE PRACTITIONER

## 2023-12-18 PROCEDURE — 99214 OFFICE O/P EST MOD 30 MIN: CPT | Performed by: NURSE PRACTITIONER

## 2023-12-18 PROCEDURE — 1160F RVW MEDS BY RX/DR IN RCRD: CPT | Performed by: NURSE PRACTITIONER

## 2023-12-18 PROCEDURE — 87880 STREP A ASSAY W/OPTIC: CPT | Performed by: NURSE PRACTITIONER

## 2023-12-18 PROCEDURE — 87428 SARSCOV & INF VIR A&B AG IA: CPT | Performed by: NURSE PRACTITIONER

## 2023-12-18 RX ORDER — DEXTROMETHORPHAN HYDROBROMIDE AND PROMETHAZINE HYDROCHLORIDE 15; 6.25 MG/5ML; MG/5ML
5 SYRUP ORAL 4 TIMES DAILY PRN
Qty: 240 ML | Refills: 0 | Status: SHIPPED | OUTPATIENT
Start: 2023-12-18

## 2023-12-18 NOTE — PROGRESS NOTES
Subjective   Chief Complaint   Patient presents with    Knee Injury     F/u     Nasal Congestion     Has been exposed to RSV at work       Gricel Rahman is a 23 y.o. female.     The patient is here today for to follow up on left knee injury. The patient was seen by Dr. Lester Allan on 11/20/2023 for this and she also was having nasal congestion. She was recently exposed to RSV at work.    She is still experiencing throbbing pain in her left knee, but the mobility and swelling have resolved. There are times when she goes to stand up and it does not feel it is stable. There are other days that she has no symptoms. She works at a  with an infant. When she gets off work and drives home, she can feel the pain. She was supposed to start physical therapy, but she never received a call. She has always had issues with her left knee. She can pop it in and out of place. She can walk, but not run.    She got sick with cold symptoms. She is an  and they had 2 confirmed cases of RSV the week of Monday, 12/04/2023. Last week, she lost her voice. She has had a persistent cough. She had recurrent streptococcal infections as a child. Her tonsils are always swollen.     I have reviewed the following portions of the patient's history and confirmed they are accurate: allergies, current medications, past family history, past medical history, past social history, past surgical history, and problem list    Review of Systems  Pertinent items are noted in HPI.     Current Outpatient Medications on File Prior to Visit   Medication Sig    vitamin D (ERGOCALCIFEROL) 1.25 MG (09575 UT) capsule capsule Take 1 capsule by mouth 1 (One) Time Per Week.     No current facility-administered medications on file prior to visit.       Objective   Vitals:    12/18/23 1335   BP: 116/78   BP Location: Right arm   Patient Position: Sitting   Pulse: 80   Resp: 18   Temp: 98.4 °F (36.9 °C)   TempSrc: Temporal   SpO2: 100%   Weight:  "57.4 kg (126 lb 9.6 oz)   Height: 162.6 cm (64\")   PainSc: 0-No pain     Body mass index is 21.73 kg/m².    Physical Exam  Vitals reviewed.   Constitutional:       Appearance: Normal appearance. She is well-developed.   HENT:      Head: Normocephalic and atraumatic.      Right Ear: Tympanic membrane, ear canal and external ear normal.      Left Ear: Tympanic membrane, ear canal and external ear normal.      Nose: Nose normal.      Mouth/Throat:      Pharynx: Posterior oropharyngeal erythema present.      Tonsils: 2+ on the right. 2+ on the left.   Eyes:      General: Lids are normal.      Conjunctiva/sclera: Conjunctivae normal.      Pupils: Pupils are equal, round, and reactive to light.   Neck:      Thyroid: No thyromegaly.      Trachea: Trachea normal.   Pulmonary:      Effort: Pulmonary effort is normal. No respiratory distress.   Musculoskeletal:      Left knee: Tenderness present.   Skin:     General: Skin is warm and dry.   Neurological:      Mental Status: She is alert and oriented to person, place, and time.      GCS: GCS eye subscore is 4. GCS verbal subscore is 5. GCS motor subscore is 6.   Psychiatric:         Attention and Perception: Attention normal.         Mood and Affect: Mood normal.         Speech: Speech normal.         Behavior: Behavior normal. Behavior is cooperative.         Assessment & Plan   Problem List Items Addressed This Visit    None  Visit Diagnoses       Acute pain of left knee    -  Primary    Tonsillitis        Relevant Orders    Covid-19 + Flu A&B AG, Veritor (Completed)    POC Rapid Strep A (Completed)    Acute cough        Relevant Medications    promethazine-dextromethorphan (PROMETHAZINE-DM) 6.25-15 MG/5ML syrup           1. Acute pain of left knee  - New, unstable.  - Continue ibuprofen and Tylenol as needed.  - Encouraged patient to schedule physical therapy appointment.  - If no improvement or worsening of symptoms, we will refer to orthopedics for consult.    2. " Tonsillitis  - Chronic, unstable.  - Completing a rapid strep.  - Referring to ENT for consult on tonsillectomy per patient request.    3. Acute cough  - New, unstable.  - Start promethazine DM as needed.  - Completing COVID-19 and influenza test.    Current Outpatient Medications:     vitamin D (ERGOCALCIFEROL) 1.25 MG (97606 UT) capsule capsule, Take 1 capsule by mouth 1 (One) Time Per Week., Disp: 4 capsule, Rfl: 2    promethazine-dextromethorphan (PROMETHAZINE-DM) 6.25-15 MG/5ML syrup, Take 5 mL by mouth 4 (Four) Times a Day As Needed for Cough., Disp: 240 mL, Rfl: 0       Plan of care reviewed with the patient at the conclusion of today's visit.  Education was provided regarding diagnosis, management, and any prescribed or recommended OTC medications.  Patient verbalized understanding of and agreement with management plan.     No follow-ups on file.      Transcribed from ambient dictation for STALIN Redd by Daisy Christine  12/18/23   13:57 EST    Patient or patient representative verbalized consent to the visit recording.  I have personally performed the services described in this document as transcribed by the above individual, and it is both accurate and complete.

## 2024-03-26 ENCOUNTER — TELEPHONE (OUTPATIENT)
Dept: INTERNAL MEDICINE | Facility: CLINIC | Age: 24
End: 2024-03-26
Payer: COMMERCIAL

## 2024-03-26 DIAGNOSIS — J02.9 PHARYNGITIS, UNSPECIFIED ETIOLOGY: Primary | ICD-10-CM

## 2024-03-26 NOTE — TELEPHONE ENCOUNTER
Caller: Gricel Rahman    Relationship: Self    Best call back number: [phone]     What is the medical concern/diagnosis: RECURRENT TONSIL ISSUES    What specialty or service is being requested: EAR NOSE AND THROAT    What is the provider, practice or medical service name: AS CHOSEN BY PROVIDER

## 2024-03-27 ENCOUNTER — TELEPHONE (OUTPATIENT)
Dept: INTERNAL MEDICINE | Facility: CLINIC | Age: 24
End: 2024-03-27

## 2024-03-27 ENCOUNTER — HOSPITAL ENCOUNTER (EMERGENCY)
Facility: HOSPITAL | Age: 24
Discharge: HOME | End: 2024-03-27
Attending: STUDENT IN AN ORGANIZED HEALTH CARE EDUCATION/TRAINING PROGRAM | Admitting: STUDENT IN AN ORGANIZED HEALTH CARE EDUCATION/TRAINING PROGRAM
Payer: COMMERCIAL

## 2024-03-27 ENCOUNTER — APPOINTMENT (OUTPATIENT)
Dept: GENERAL RADIOLOGY | Facility: HOSPITAL | Age: 24
End: 2024-03-27
Payer: COMMERCIAL

## 2024-03-27 VITALS
SYSTOLIC BLOOD PRESSURE: 124 MMHG | OXYGEN SATURATION: 100 % | HEART RATE: 83 BPM | WEIGHT: 128 LBS | DIASTOLIC BLOOD PRESSURE: 62 MMHG | RESPIRATION RATE: 18 BRPM | HEIGHT: 64 IN | TEMPERATURE: 97.9 F | BODY MASS INDEX: 21.85 KG/M2

## 2024-03-27 DIAGNOSIS — J02.0 STREP PHARYNGITIS: Primary | ICD-10-CM

## 2024-03-27 LAB — S PYO AG THROAT QL: POSITIVE

## 2024-03-27 PROCEDURE — 25010000002 DEXAMETHASONE PER 1 MG: Performed by: PHYSICIAN ASSISTANT

## 2024-03-27 PROCEDURE — 87880 STREP A ASSAY W/OPTIC: CPT | Performed by: PHYSICIAN ASSISTANT

## 2024-03-27 PROCEDURE — 99283 EMERGENCY DEPT VISIT LOW MDM: CPT

## 2024-03-27 PROCEDURE — 71045 X-RAY EXAM CHEST 1 VIEW: CPT

## 2024-03-27 RX ORDER — CEPHALEXIN 500 MG/1
500 CAPSULE ORAL 2 TIMES DAILY
Qty: 20 CAPSULE | Refills: 0 | Status: SHIPPED | OUTPATIENT
Start: 2024-03-27

## 2024-03-27 RX ADMIN — DEXAMETHASONE SODIUM PHOSPHATE 10 MG: 10 INJECTION INTRAMUSCULAR; INTRAVENOUS at 16:31

## 2024-03-27 NOTE — Clinical Note
[facility] EMERGENCY DEPARTMENT  [address]  formerly [facility] [address]  Phone: [phone]    Gricel Rahman was seen and treated in our emergency department on 3/27/2024.  She may return to work on 03/30/2024.         Thank you for choosing [facility].    Victorino Mcgregor PA

## 2024-03-27 NOTE — ED PROVIDER NOTES
Subjective   History of Present Illness  23-year-old female presents emergency department today with a sore throat.  She reports that both tonsils are large and have exudate.  She used to get recurrent strep throat but has not had it in about 2 years.  She had a low-grade fever she had little nausea but no vomiting low bit of a headache.  No rash or lesions noted.    History provided by:  Patient   used: No    Sore Throat  Location:  Posterior  Quality:  Sore  Severity:  Moderate  Onset quality:  Gradual  Duration:  2 days  Timing:  Constant  Progression:  Unchanged  Chronicity:  New  Relieved by:  Nothing  Worsened by:  Eating, swallowing and drinking  Ineffective treatments:  None tried  Associated symptoms: fever    Associated symptoms: no abdominal pain, no chest pain, no chills, no drooling, no ear discharge, no night sweats, no plugged ear sensation, no postnasal drip and no rash    Risk factors: no exposure to strep, no exposure to mono, no sick contacts and no recent dental procedure        Review of Systems   Constitutional:  Positive for fever. Negative for chills and night sweats.   HENT:  Positive for sore throat. Negative for drooling, ear discharge and postnasal drip.    Cardiovascular:  Negative for chest pain.   Gastrointestinal:  Negative for abdominal pain.   Skin:  Negative for rash.       Past Medical History:   Diagnosis Date   • MRSA (methicillin resistant staph aureus) culture positive 06/2017   • Pneumonia     as inflant, multiple episodes       No Known Allergies    Past Surgical History:   Procedure Laterality Date   • DILATATION AND CURETTAGE         No family history on file.    Social History     Socioeconomic History   • Marital status: Single   Tobacco Use   • Smoking status: Never   • Smokeless tobacco: Never   Vaping Use   • Vaping status: Never Used   Substance and Sexual Activity   • Alcohol use: No     Comment: none since positive preg test   • Drug use: No   •  Sexual activity: Yes           Objective   Physical Exam  Vitals and nursing note reviewed.   Constitutional:       General: She is not in acute distress.     Appearance: She is well-developed. She is not diaphoretic.   HENT:      Head: Normocephalic and atraumatic.      Right Ear: Tympanic membrane normal.      Left Ear: Tympanic membrane normal.      Nose: Nose normal.      Mouth/Throat:      Mouth: Mucous membranes are moist. No oral lesions.      Pharynx: No pharyngeal swelling, oropharyngeal exudate, posterior oropharyngeal erythema or uvula swelling.      Tonsils: Tonsillar exudate present. No tonsillar abscesses. 3+ on the right. 3+ on the left.      Comments: Uvula midline  Eyes:      General: No scleral icterus.     Conjunctiva/sclera: Conjunctivae normal.   Cardiovascular:      Rate and Rhythm: Normal rate and regular rhythm.      Heart sounds: Normal heart sounds. No murmur heard.  Pulmonary:      Effort: Pulmonary effort is normal. No respiratory distress.      Breath sounds: Normal breath sounds.   Abdominal:      General: Bowel sounds are normal.      Palpations: Abdomen is soft.      Tenderness: There is no abdominal tenderness.   Musculoskeletal:         General: Normal range of motion.      Cervical back: Normal range of motion and neck supple.   Skin:     General: Skin is warm and dry.   Neurological:      Mental Status: She is alert and oriented to person, place, and time.   Psychiatric:         Behavior: Behavior normal.       Procedures           ED Course  ED Course as of 03/27/24 1728   Wed Mar 27, 2024   1727 Strep screen was positive.  We discussed the findings.  Called in Keflex.  She given a dose of Decadron here we will follow-up with her primary care off work through Saturday. [MARCIAL]      ED Course User Index  [MARCIAL] Victorino Mcgregor PA                                 Recent Results (from the past 24 hour(s))   Rapid Strep A Screen - Swab, Throat    Collection Time: 03/27/24  4:11 PM     "Specimen: Throat; Swab   Result Value Ref Range    Strep A Ag Positive (A) Negative     Note: In addition to lab results from this visit, the labs listed above may include labs taken at another facility or during a different encounter within the last 24 hours. Please correlate lab times with ED admission and discharge times for further clarification of the services performed during this visit.    XR Chest 1 View   Final Result   Impression:   No acute process identified.         Electronically Signed: Ethan Swift MD     3/27/2024 4:51 PM EDT     Workstation ID: [id number]        Vitals:    03/27/24 1516   BP: 124/62   Pulse: 83   Resp: 18   Temp: 97.9 °F (36.6 °C)   SpO2: 100%   Weight: 58.1 kg (128 lb)   Height: 162.6 cm (64\")     Medications   dexAMETHasone (DECADRON) 10 MG/ML oral solution 10 mg (10 mg Oral Given 3/27/24 1631)     ECG/EMG Results (last 24 hours)       ** No results found for the last 24 hours. **          No orders to display                   Medical Decision Making  Problems Addressed:  Strep pharyngitis: complicated acute illness or injury    Amount and/or Complexity of Data Reviewed  Radiology: ordered.    Risk  Prescription drug management.        Final diagnoses:   Strep pharyngitis       ED Disposition  ED Disposition       ED Disposition   Discharge    Condition   Stable    Comment   --               Jesica Marte, APRN  [address] [address]  [phone]               Medication List        New Prescriptions      cephalexin 500 MG capsule  Commonly known as: KEFLEX  Take 1 capsule by mouth 2 (Two) Times a Day.               Where to Get Your Medications        These medications were sent to [facility] [address] [place] [address] [phone]  - [phone] [place] [address]      Phone: [phone]   cephalexin 500 MG capsule            Victorino Mcgregor PA  03/27/24 1930    "

## 2024-03-27 NOTE — TELEPHONE ENCOUNTER
Caller: Gricel Rahman    Relationship to patient: Self    Best call back number: [phone]     Chief complaint: CHEST TIGHTNESS    Patient directed to call 911 or go to their nearest emergency room.     Patient verbalized understanding: [x] Yes  [] No  If no, why?    Additional notes:

## 2024-04-05 ENCOUNTER — TELEPHONE (OUTPATIENT)
Dept: INTERNAL MEDICINE | Facility: CLINIC | Age: 24
End: 2024-04-05
Payer: COMMERCIAL

## 2024-04-05 NOTE — TELEPHONE ENCOUNTER
Referral placed. ENT may require patient be seen by me so there is office note and documentation first. Will try for referral.

## 2024-05-03 ENCOUNTER — OFFICE VISIT (OUTPATIENT)
Dept: INTERNAL MEDICINE | Facility: CLINIC | Age: 24
End: 2024-05-03
Payer: COMMERCIAL

## 2024-05-03 VITALS
HEIGHT: 64 IN | WEIGHT: 125.8 LBS | HEART RATE: 83 BPM | SYSTOLIC BLOOD PRESSURE: 118 MMHG | BODY MASS INDEX: 21.48 KG/M2 | TEMPERATURE: 98.4 F | RESPIRATION RATE: 16 BRPM | DIASTOLIC BLOOD PRESSURE: 80 MMHG | OXYGEN SATURATION: 100 %

## 2024-05-03 DIAGNOSIS — Z13.1 SCREENING FOR DIABETES MELLITUS: ICD-10-CM

## 2024-05-03 DIAGNOSIS — R30.0 DYSURIA: ICD-10-CM

## 2024-05-03 DIAGNOSIS — Z13.0 SCREENING FOR BLOOD DISEASE: ICD-10-CM

## 2024-05-03 DIAGNOSIS — Z13.29 THYROID DISORDER SCREENING: ICD-10-CM

## 2024-05-03 DIAGNOSIS — Z13.220 LIPID SCREENING: ICD-10-CM

## 2024-05-03 DIAGNOSIS — Z12.4 CERVICAL CANCER SCREENING: ICD-10-CM

## 2024-05-03 DIAGNOSIS — E86.0 DEHYDRATION: ICD-10-CM

## 2024-05-03 DIAGNOSIS — E55.9 VITAMIN D DEFICIENCY: ICD-10-CM

## 2024-05-03 DIAGNOSIS — J02.0 RECURRENT STREPTOCOCCAL PHARYNGITIS: ICD-10-CM

## 2024-05-03 DIAGNOSIS — N76.0 ACUTE VAGINITIS: Primary | ICD-10-CM

## 2024-05-03 DIAGNOSIS — Z13.21 ENCOUNTER FOR VITAMIN DEFICIENCY SCREENING: ICD-10-CM

## 2024-05-03 LAB
BILIRUB BLD-MCNC: NEGATIVE MG/DL
CLARITY, POC: CLEAR
COLOR UR: YELLOW
EXPIRATION DATE: NORMAL
GLUCOSE UR STRIP-MCNC: NEGATIVE MG/DL
KETONES UR QL: NEGATIVE
LEUKOCYTE EST, POC: NEGATIVE
Lab: NORMAL
NITRITE UR-MCNC: NEGATIVE MG/ML
PH UR: 6 [PH] (ref 5–8)
PROT UR STRIP-MCNC: NEGATIVE MG/DL
RBC # UR STRIP: NEGATIVE /UL
SP GR UR: 1.02 (ref 1–1.03)
UROBILINOGEN UR QL: NORMAL

## 2024-05-03 PROCEDURE — 87801 DETECT AGNT MULT DNA AMPLI: CPT | Performed by: NURSE PRACTITIONER

## 2024-05-03 PROCEDURE — 87529 HSV DNA AMP PROBE: CPT | Performed by: NURSE PRACTITIONER

## 2024-05-03 PROCEDURE — 87798 DETECT AGENT NOS DNA AMP: CPT | Performed by: NURSE PRACTITIONER

## 2024-05-03 PROCEDURE — 87661 TRICHOMONAS VAGINALIS AMPLIF: CPT | Performed by: NURSE PRACTITIONER

## 2024-05-03 PROCEDURE — 87591 N.GONORRHOEAE DNA AMP PROB: CPT | Performed by: NURSE PRACTITIONER

## 2024-05-03 PROCEDURE — 87491 CHLMYD TRACH DNA AMP PROBE: CPT | Performed by: NURSE PRACTITIONER

## 2024-05-03 NOTE — PROGRESS NOTES
"Subjective   Chief Complaint   Patient presents with    Urinary Tract Infection     Requesting to be tested, \"just finished a steroid and is now having an unusual urine smell and stomach cramping\"       Gricel Rahman is a 24 y.o. female.     The patient is here today for a follow up.    The patient is doing well.    During her previous visit, the patient expressed a desire for a referral to an Ear, Nose, and Throat (ENT) specialist. However, each time she sought medical attention at an urgent care facility, she was consistently negative for strep throat. However, she recently visited the emergency room due to severe throat swelling and difficulty breathing, which led to a positive strep test. She was prescribed a potent steroid, which she completed approximately 2 weeks ago. Since completing the steroid course, she has experienced cramping, reminiscent of a previous urinary tract infection (UTI) or yeast infection, accompanied by a strong vaginal odor and clumpy discharge. She expresses concern about the onset of an infection due to her sensitivity to medications and is not breastfeeding.    The patient queries the necessity of continuing her vitamin B supplementation.    I have reviewed the following portions of the patient's history and confirmed they are accurate: allergies, current medications, past family history, past medical history, past social history, past surgical history, and problem list    Review of Systems  Pertinent items are noted in HPI.     Current Outpatient Medications on File Prior to Visit   Medication Sig    vitamin D (ERGOCALCIFEROL) 1.25 MG (64785 UT) capsule capsule Take 1 capsule by mouth 1 (One) Time Per Week.    cephalexin (KEFLEX) 500 MG capsule Take 1 capsule by mouth 2 (Two) Times a Day. (Patient not taking: Reported on 5/3/2024)    promethazine-dextromethorphan (PROMETHAZINE-DM) 6.25-15 MG/5ML syrup Take 5 mL by mouth 4 (Four) Times a Day As Needed for Cough. (Patient not " "taking: Reported on 5/3/2024)     No current facility-administered medications on file prior to visit.       Objective   Vitals:    05/03/24 1431   BP: 118/80   BP Location: Left arm   Patient Position: Sitting   Cuff Size: Adult   Pulse: 83   Resp: 16   Temp: 98.4 °F (36.9 °C)   TempSrc: Temporal   SpO2: 100%   Weight: 57.1 kg (125 lb 12.8 oz)   Height: 162.6 cm (64\")   PainSc: 0-No pain     Body mass index is 21.59 kg/m².    Physical Exam  Vitals reviewed.   Constitutional:       Appearance: Normal appearance. She is well-developed.   HENT:      Head: Normocephalic and atraumatic.      Nose: Nose normal.      Mouth/Throat:      Lips: Pink.      Mouth: Mucous membranes are moist.      Pharynx: Oropharynx is clear.   Eyes:      General: Lids are normal.      Conjunctiva/sclera: Conjunctivae normal.      Pupils: Pupils are equal, round, and reactive to light.   Neck:      Thyroid: No thyromegaly.      Trachea: Trachea normal.   Pulmonary:      Effort: Pulmonary effort is normal. No respiratory distress.   Skin:     General: Skin is warm and dry.   Neurological:      Mental Status: She is alert and oriented to person, place, and time.      GCS: GCS eye subscore is 4. GCS verbal subscore is 5. GCS motor subscore is 6.   Psychiatric:         Attention and Perception: Attention normal.         Mood and Affect: Mood normal.         Speech: Speech normal.         Behavior: Behavior normal. Behavior is cooperative.         Assessment & Plan   Problem List Items Addressed This Visit    None  Visit Diagnoses       Acute vaginitis    -  Primary    Relevant Orders    NuSwab VG+ & HSV (Completed)    Dehydration        Recurrent streptococcal pharyngitis        Vitamin D deficiency        Relevant Orders    Vitamin D,25-Hydroxy    Dysuria        Relevant Orders    POC Urinalysis Dipstick, Automated (Completed)    Cervical cancer screening        Relevant Orders    Ambulatory Referral to Obstetrics / Gynecology (Completed)    " Screening for blood disease        Relevant Orders    CBC (No Diff)    Comprehensive Metabolic Panel    Lipid Panel    Hemoglobin A1c    TSH Rfx On Abnormal To Free T4    Vitamin B12 & Folate    Vitamin D,25-Hydroxy    Thyroid disorder screening        Relevant Orders    TSH Rfx On Abnormal To Free T4    Lipid screening        Relevant Orders    Lipid Panel    Encounter for vitamin deficiency screening        Relevant Orders    Vitamin B12 & Folate    Vitamin D,25-Hydroxy    Screening for diabetes mellitus        Relevant Orders    Hemoglobin A1c               Current Outpatient Medications:     vitamin D (ERGOCALCIFEROL) 1.25 MG (30615 UT) capsule capsule, Take 1 capsule by mouth 1 (One) Time Per Week., Disp: 4 capsule, Rfl: 2    cephalexin (KEFLEX) 500 MG capsule, Take 1 capsule by mouth 2 (Two) Times a Day. (Patient not taking: Reported on 5/3/2024), Disp: 20 capsule, Rfl: 0    promethazine-dextromethorphan (PROMETHAZINE-DM) 6.25-15 MG/5ML syrup, Take 5 mL by mouth 4 (Four) Times a Day As Needed for Cough. (Patient not taking: Reported on 5/3/2024), Disp: 240 mL, Rfl: 0       1. Acute vaginitis.  - New, unstable.  - Completing self vaginal swab.    2. Dehydration with dysuria.   - New, unstable.   - UA normal.   - Patient will increase water intake and follow up if symptoms do not improve.     3. Recurrent strep pharyngitis.  - Chronic, unstable.   - This provider will follow up with referral coordinator for patient to be contacted with her ENT appointment.     4. Vitamin D deficiency.  - Chronic, unstable.   - Patient will come by for yearly screening labs.   - Continue vitamin D supplement.      Plan of care reviewed with the patient at the conclusion of today's visit.  Education was provided regarding diagnosis, management, and any prescribed or recommended OTC medications.  Patient verbalized understanding of and agreement with management plan.     Return if symptoms worsen or fail to  improve.        Transcribed from ambient dictation for STALIN Redd by Isabel Rodriguez.  05/03/24   15:17 EDT    Patient or patient representative verbalized consent to the visit recording.  I have personally performed the services described in this document as transcribed by the above individual, and it is both accurate and complete.

## 2024-05-10 LAB
A VAGINAE DNA VAG QL NAA+PROBE: ABNORMAL SCORE
BVAB2 DNA VAG QL NAA+PROBE: ABNORMAL SCORE
C ALBICANS DNA VAG QL NAA+PROBE: POSITIVE
C GLABRATA DNA VAG QL NAA+PROBE: NEGATIVE
C TRACH DNA VAG QL NAA+PROBE: NEGATIVE
HSV1 DNA SPEC QL NAA+PROBE: NEGATIVE
HSV2 DNA SPEC QL NAA+PROBE: NEGATIVE
MEGA1 DNA VAG QL NAA+PROBE: ABNORMAL SCORE
N GONORRHOEA DNA VAG QL NAA+PROBE: NEGATIVE
T VAGINALIS DNA VAG QL NAA+PROBE: NEGATIVE

## 2024-05-19 RX ORDER — FLUCONAZOLE 150 MG/1
TABLET ORAL
Qty: 2 TABLET | Refills: 0 | Status: SHIPPED | OUTPATIENT
Start: 2024-05-19